# Patient Record
Sex: MALE | Race: WHITE
[De-identification: names, ages, dates, MRNs, and addresses within clinical notes are randomized per-mention and may not be internally consistent; named-entity substitution may affect disease eponyms.]

---

## 2019-10-10 ENCOUNTER — HOSPITAL ENCOUNTER (EMERGENCY)
Dept: HOSPITAL 26 - MED | Age: 59
Discharge: LEFT BEFORE DISCHARGE | End: 2019-10-10
Payer: SELF-PAY

## 2019-10-10 VITALS
SYSTOLIC BLOOD PRESSURE: 177 MMHG | WEIGHT: 195 LBS | BODY MASS INDEX: 28.88 KG/M2 | DIASTOLIC BLOOD PRESSURE: 100 MMHG | HEIGHT: 69 IN

## 2019-10-10 VITALS — SYSTOLIC BLOOD PRESSURE: 174 MMHG | DIASTOLIC BLOOD PRESSURE: 106 MMHG

## 2019-10-10 DIAGNOSIS — R07.9: ICD-10-CM

## 2019-10-10 DIAGNOSIS — F10.129: Primary | ICD-10-CM

## 2019-10-10 DIAGNOSIS — I10: ICD-10-CM

## 2019-10-10 LAB
ALBUMIN FLD-MCNC: 3.9 G/DL (ref 3.4–5)
ANION GAP SERPL CALCULATED.3IONS-SCNC: 16 MMOL/L (ref 8–16)
AST SERPL-CCNC: 149 U/L (ref 15–37)
BASOPHILS # BLD AUTO: 0.1 K/UL (ref 0–0.22)
BASOPHILS NFR BLD AUTO: 1.5 % (ref 0–2)
BILIRUB SERPL-MCNC: 1 MG/DL (ref 0–1)
BUN SERPL-MCNC: 4 MG/DL (ref 7–18)
CHLORIDE SERPL-SCNC: 96 MMOL/L (ref 98–107)
CO2 SERPL-SCNC: 26.9 MMOL/L (ref 21–32)
CREAT SERPL-MCNC: 0.7 MG/DL (ref 0.7–1.3)
EOSINOPHIL # BLD AUTO: 0.1 K/UL (ref 0–0.4)
EOSINOPHIL NFR BLD AUTO: 1.6 % (ref 0–4)
ERYTHROCYTE [DISTWIDTH] IN BLOOD BY AUTOMATED COUNT: 15 % (ref 11.6–13.7)
GFR SERPL CREATININE-BSD FRML MDRD: 149 ML/MIN (ref 90–?)
GLUCOSE SERPL-MCNC: 114 MG/DL (ref 74–106)
HCT VFR BLD AUTO: 46.3 % (ref 36–52)
HGB BLD-MCNC: 15.7 G/DL (ref 12–18)
LYMPHOCYTES # BLD AUTO: 1.4 K/UL (ref 2–11.5)
LYMPHOCYTES NFR BLD AUTO: 21 % (ref 20.5–51.1)
MCH RBC QN AUTO: 32 PG (ref 27–31)
MCHC RBC AUTO-ENTMCNC: 34 G/DL (ref 33–37)
MCV RBC AUTO: 93.4 FL (ref 80–94)
MONOCYTES # BLD AUTO: 0.7 K/UL (ref 0.8–1)
MONOCYTES NFR BLD AUTO: 11.4 % (ref 1.7–9.3)
NEUTROPHILS # BLD AUTO: 4.2 K/UL (ref 1.8–7.7)
NEUTROPHILS NFR BLD AUTO: 64.5 % (ref 42.2–75.2)
PLATELET # BLD AUTO: 250 K/UL (ref 140–450)
POTASSIUM SERPL-SCNC: 3.9 MMOL/L (ref 3.5–5.1)
RBC # BLD AUTO: 4.96 MIL/UL (ref 4.2–6.1)
SODIUM SERPL-SCNC: 135 MMOL/L (ref 136–145)
WBC # BLD AUTO: 6.5 K/UL (ref 4.8–10.8)

## 2019-10-10 PROCEDURE — 71045 X-RAY EXAM CHEST 1 VIEW: CPT

## 2019-10-10 PROCEDURE — 80053 COMPREHEN METABOLIC PANEL: CPT

## 2019-10-10 PROCEDURE — 93005 ELECTROCARDIOGRAM TRACING: CPT

## 2019-10-10 PROCEDURE — 99284 EMERGENCY DEPT VISIT MOD MDM: CPT

## 2019-10-10 PROCEDURE — 84484 ASSAY OF TROPONIN QUANT: CPT

## 2019-10-10 PROCEDURE — 85025 COMPLETE CBC W/AUTO DIFF WBC: CPT

## 2019-10-10 PROCEDURE — 36415 COLL VENOUS BLD VENIPUNCTURE: CPT

## 2019-10-10 NOTE — NUR
RECEIVED REPORT FROM LETICIA STOUT. PT STABLE AT THIS TIME. STATES HE WANTS TO GO 
HOME. MD MADE AWARE, WAITING FOR LAB RESULTS.

## 2019-10-10 NOTE — NUR
PATIENT ELOPED FROM FACILITY. DISCHARGE INSTRUCTIONS NOT GIVEN TO PATIENT. DR. CERON NOTIFIED. PT WAS INFORMED THAT  WAS WAITING ON LAB RESULTS BEFORE 
D/C.

## 2019-10-10 NOTE — NUR
BIBA FROM IN FRONT OF [facility]. PER EMS, PT ADMITS TO DRINKING BEER, 
JOSH WILD WAS ON SCENE. PT WITH INITIAL COMPLAINT OF MIDSTERNAL CP THAT HAS 
RESOLVED ON THE WAY HERE. PT DENIES ANY PAIN OR SOB AT THIS TIME. PATIENT 
STATES PAIN OF 0/10 AT THIS TIME; VSS; PATIENT POSITIONED FOR COMFORT; HOB 
ELEVATED; BEDRAILS UP X2; BED DOWN. ER MD MADE AWARE OF PT STATUS. PT IS ON 
MONITOR.

## 2020-06-24 ENCOUNTER — HOSPITAL ENCOUNTER (EMERGENCY)
Dept: HOSPITAL 26 - MED | Age: 60
Discharge: HOME | End: 2020-06-24
Payer: SELF-PAY

## 2020-06-24 VITALS — SYSTOLIC BLOOD PRESSURE: 124 MMHG | DIASTOLIC BLOOD PRESSURE: 72 MMHG

## 2020-06-24 VITALS — WEIGHT: 171 LBS | BODY MASS INDEX: 25.33 KG/M2 | HEIGHT: 69 IN

## 2020-06-24 VITALS — SYSTOLIC BLOOD PRESSURE: 134 MMHG | DIASTOLIC BLOOD PRESSURE: 84 MMHG

## 2020-06-24 DIAGNOSIS — R07.9: Primary | ICD-10-CM

## 2020-06-24 DIAGNOSIS — I10: ICD-10-CM

## 2020-06-24 LAB
ALBUMIN FLD-MCNC: 2.5 G/DL (ref 3.4–5)
ANION GAP SERPL CALCULATED.3IONS-SCNC: 13.6 MMOL/L (ref 8–16)
AST SERPL-CCNC: 84 U/L (ref 15–37)
BASOPHILS # BLD AUTO: 0.2 K/UL (ref 0–0.22)
BASOPHILS NFR BLD AUTO: 2.1 % (ref 0–2)
BILIRUB SERPL-MCNC: 0.7 MG/DL (ref 0–1)
BUN SERPL-MCNC: 3 MG/DL (ref 7–18)
CHLORIDE SERPL-SCNC: 95 MMOL/L (ref 98–107)
CO2 SERPL-SCNC: 26.8 MMOL/L (ref 21–32)
CREAT SERPL-MCNC: 0.9 MG/DL (ref 0.6–1.3)
EOSINOPHIL # BLD AUTO: 0.1 K/UL (ref 0–0.4)
EOSINOPHIL NFR BLD AUTO: 0.9 % (ref 0–4)
ERYTHROCYTE [DISTWIDTH] IN BLOOD BY AUTOMATED COUNT: 15.2 % (ref 11.6–13.7)
GFR SERPL CREATININE-BSD FRML MDRD: 111 ML/MIN (ref 90–?)
GLUCOSE SERPL-MCNC: 96 MG/DL (ref 74–106)
HCT VFR BLD AUTO: 41 % (ref 36–52)
HGB BLD-MCNC: 13.7 G/DL (ref 12–18)
LYMPHOCYTES # BLD AUTO: 2.7 K/UL (ref 2–11.5)
LYMPHOCYTES NFR BLD AUTO: 34.7 % (ref 20.5–51.1)
MCH RBC QN AUTO: 31 PG (ref 27–31)
MCHC RBC AUTO-ENTMCNC: 33 G/DL (ref 33–37)
MCV RBC AUTO: 94 FL (ref 80–94)
MONOCYTES # BLD AUTO: 0.7 K/UL (ref 0.8–1)
MONOCYTES NFR BLD AUTO: 9.3 % (ref 1.7–9.3)
NEUTROPHILS # BLD AUTO: 4.1 K/UL (ref 1.8–7.7)
NEUTROPHILS NFR BLD AUTO: 53 % (ref 42.2–75.2)
PLATELET # BLD AUTO: 267 K/UL (ref 140–450)
POTASSIUM SERPL-SCNC: 3.4 MMOL/L (ref 3.5–5.1)
RBC # BLD AUTO: 4.36 MIL/UL (ref 4.2–6.1)
SODIUM SERPL-SCNC: 132 MMOL/L (ref 136–145)
WBC # BLD AUTO: 7.8 K/UL (ref 4.8–10.8)

## 2020-06-24 NOTE — NUR
59/M C/O CHEST PAIN X 3 DAYS. STARTED AFTER DOING PUSH UPS, PAIN SUBSIDES WITH 
REST. TODAY CP STARTED AFTER DRINKING 6-7 BEERS TODAY. DENIES ASSOCIATED SOB, 
N/V, DIZZINESS. DENIES CP AT THIS TIME.



HX- DENIES

NKA

## 2021-03-03 ENCOUNTER — HOSPITAL ENCOUNTER (EMERGENCY)
Dept: HOSPITAL 26 - MED | Age: 61
Discharge: NURSING, REHAB OR LONG-TERM CARE | End: 2021-03-03
Payer: SELF-PAY

## 2021-03-03 VITALS — DIASTOLIC BLOOD PRESSURE: 64 MMHG | SYSTOLIC BLOOD PRESSURE: 125 MMHG

## 2021-03-03 VITALS — BODY MASS INDEX: 25.44 KG/M2 | HEIGHT: 64 IN | WEIGHT: 149 LBS

## 2021-03-03 VITALS — SYSTOLIC BLOOD PRESSURE: 142 MMHG | DIASTOLIC BLOOD PRESSURE: 99 MMHG
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
## 2021-03-03 DIAGNOSIS — S01.01XA: Primary | ICD-10-CM
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
## 2021-03-03 DIAGNOSIS — Z87.448: ICD-10-CM

## 2021-03-03 DIAGNOSIS — S09.8XXA: ICD-10-CM

## 2021-03-03 DIAGNOSIS — I10: ICD-10-CM

## 2021-03-03 DIAGNOSIS — Z79.899: ICD-10-CM

## 2021-03-03 PROCEDURE — 13121 CMPLX RPR S/A/L 2.6-7.5 CM: CPT

## 2021-03-03 PROCEDURE — 96372 THER/PROPH/DIAG INJ SC/IM: CPT

## 2021-03-03 PROCEDURE — 70450 CT HEAD/BRAIN W/O DYE: CPT

## 2021-03-03 PROCEDURE — 72125 CT NECK SPINE W/O DYE: CPT

## 2021-03-03 PROCEDURE — 99285 EMERGENCY DEPT VISIT HI MDM: CPT

## 2021-03-03 NOTE — NUR
s/w Zully, from [facility]. [phone]. 

stated only to be seen for hematoma on head and that Hospice will arrange for 
transport back after d/c.

## 2021-03-03 NOTE — NUR
s/w Helena from [facility] for an update of pt. report given. ETA for 
transport approximately [phone].

## 2021-03-03 NOTE — NUR
pt remains laying down in bed in semi fowlers position. pt a/o x 4, gcs 15. 
able to move all extremities. Does not appear to be in any distress. awaiting 
transport back to facility.

## 2021-07-27 ENCOUNTER — HOSPITAL ENCOUNTER (INPATIENT)
Dept: HOSPITAL 26 - MED | Age: 61
LOS: 2 days | Discharge: HOME HEALTH | DRG: 227 | End: 2021-07-29
Attending: INTERNAL MEDICINE | Admitting: INTERNAL MEDICINE
Payer: COMMERCIAL

## 2021-07-27 VITALS — HEIGHT: 69 IN | WEIGHT: 180 LBS | BODY MASS INDEX: 26.66 KG/M2

## 2021-07-27 VITALS — SYSTOLIC BLOOD PRESSURE: 149 MMHG | DIASTOLIC BLOOD PRESSURE: 106 MMHG

## 2021-07-27 DIAGNOSIS — K76.6: ICD-10-CM

## 2021-07-27 DIAGNOSIS — K74.60: ICD-10-CM

## 2021-07-27 DIAGNOSIS — Z20.822: ICD-10-CM

## 2021-07-27 DIAGNOSIS — I10: ICD-10-CM

## 2021-07-27 DIAGNOSIS — R18.8: ICD-10-CM

## 2021-07-27 DIAGNOSIS — Z79.899: ICD-10-CM

## 2021-07-27 DIAGNOSIS — E44.0: ICD-10-CM

## 2021-07-27 DIAGNOSIS — E86.0: ICD-10-CM

## 2021-07-27 DIAGNOSIS — K42.0: Primary | ICD-10-CM

## 2021-07-27 LAB
ALBUMIN FLD-MCNC: 2.8 G/DL (ref 3.4–5)
ANION GAP SERPL CALCULATED.3IONS-SCNC: 7.2 MMOL/L (ref 8–16)
APPEARANCE UR: (no result)
AST SERPL-CCNC: 35 U/L (ref 15–37)
BASOPHILS # BLD AUTO: 0.1 K/UL (ref 0–0.22)
BASOPHILS NFR BLD AUTO: 0.3 % (ref 0–2)
BILIRUB SERPL-MCNC: 1.2 MG/DL (ref 0–1)
BILIRUB UR QL STRIP: (no result)
BUN SERPL-MCNC: 31 MG/DL (ref 7–18)
CHLORIDE SERPL-SCNC: 93 MMOL/L (ref 98–107)
CO2 SERPL-SCNC: 35.9 MMOL/L (ref 21–32)
COLOR UR: (no result)
CREAT SERPL-MCNC: 1.3 MG/DL (ref 0.6–1.3)
EOSINOPHIL # BLD AUTO: 0 K/UL (ref 0–0.4)
EOSINOPHIL NFR BLD AUTO: 0.1 % (ref 0–4)
ERYTHROCYTE [DISTWIDTH] IN BLOOD BY AUTOMATED COUNT: 13.9 % (ref 11.6–13.7)
GFR SERPL CREATININE-BSD FRML MDRD: 72 ML/MIN (ref 90–?)
GLUCOSE SERPL-MCNC: 110 MG/DL (ref 74–106)
GLUCOSE UR STRIP-MCNC: NEGATIVE MG/DL
HCT VFR BLD AUTO: 47.5 % (ref 36–52)
HGB BLD-MCNC: 16 G/DL (ref 12–18)
HGB UR QL STRIP: (no result)
LEUKOCYTE ESTERASE UR QL STRIP: NEGATIVE
LIPASE SERPL-CCNC: 62 U/L (ref 73–393)
LYMPHOCYTES # BLD AUTO: 1.2 K/UL (ref 2–11.5)
LYMPHOCYTES NFR BLD AUTO: 7 % (ref 20.5–51.1)
MCH RBC QN AUTO: 31 PG (ref 27–31)
MCHC RBC AUTO-ENTMCNC: 34 G/DL (ref 33–37)
MCV RBC AUTO: 91.1 FL (ref 80–94)
MONOCYTES # BLD AUTO: 1.4 K/UL (ref 0.8–1)
MONOCYTES NFR BLD AUTO: 8 % (ref 1.7–9.3)
NEUTROPHILS # BLD AUTO: 14.4 K/UL (ref 1.8–7.7)
NEUTROPHILS NFR BLD AUTO: 84.6 % (ref 42.2–75.2)
NITRITE UR QL STRIP: NEGATIVE
PH UR STRIP: 5.5 [PH] (ref 5–9)
PLATELET # BLD AUTO: 307 K/UL (ref 140–450)
POTASSIUM SERPL-SCNC: 4.1 MMOL/L (ref 3.5–5.1)
PROTHROMBIN TIME: 10.3 SECS (ref 10.8–13.4)
RBC # BLD AUTO: 5.22 MIL/UL (ref 4.2–6.1)
SODIUM SERPL-SCNC: 132 MMOL/L (ref 136–145)
WBC # BLD AUTO: 17.1 K/UL (ref 4.8–10.8)

## 2021-07-27 PROCEDURE — P9041 ALBUMIN (HUMAN),5%, 50ML: HCPCS

## 2021-07-27 PROCEDURE — P9046 ALBUMIN (HUMAN), 25%, 20 ML: HCPCS

## 2021-07-27 PROCEDURE — 0WQF0ZZ REPAIR ABDOMINAL WALL, OPEN APPROACH: ICD-10-PCS | Performed by: SURGERY

## 2021-07-27 RX ADMIN — DEXTROSE SCH MLS/HR: 50 INJECTION, SOLUTION INTRAVENOUS at 21:00

## 2021-07-27 RX ADMIN — SODIUM CHLORIDE SCH MLS/HR: 9 INJECTION, SOLUTION INTRAVENOUS at 18:13

## 2021-07-28 VITALS — DIASTOLIC BLOOD PRESSURE: 97 MMHG | SYSTOLIC BLOOD PRESSURE: 140 MMHG

## 2021-07-28 VITALS — SYSTOLIC BLOOD PRESSURE: 115 MMHG | DIASTOLIC BLOOD PRESSURE: 69 MMHG

## 2021-07-28 VITALS — DIASTOLIC BLOOD PRESSURE: 71 MMHG | SYSTOLIC BLOOD PRESSURE: 107 MMHG

## 2021-07-28 VITALS — DIASTOLIC BLOOD PRESSURE: 77 MMHG | SYSTOLIC BLOOD PRESSURE: 108 MMHG

## 2021-07-28 LAB
ALBUMIN FLD-MCNC: 2.4 G/DL (ref 3.4–5)
ANION GAP SERPL CALCULATED.3IONS-SCNC: 9.6 MMOL/L (ref 8–16)
AST SERPL-CCNC: 25 U/L (ref 15–37)
BASOPHILS # BLD AUTO: 0.1 K/UL (ref 0–0.22)
BASOPHILS NFR BLD AUTO: 0.6 % (ref 0–2)
BILIRUB SERPL-MCNC: 1.1 MG/DL (ref 0–1)
BUN SERPL-MCNC: 29 MG/DL (ref 7–18)
CHLORIDE SERPL-SCNC: 101 MMOL/L (ref 98–107)
CO2 SERPL-SCNC: 26.5 MMOL/L (ref 21–32)
CREAT SERPL-MCNC: 1.1 MG/DL (ref 0.6–1.3)
EOSINOPHIL # BLD AUTO: 0.2 K/UL (ref 0–0.4)
EOSINOPHIL NFR BLD AUTO: 1.9 % (ref 0–4)
ERYTHROCYTE [DISTWIDTH] IN BLOOD BY AUTOMATED COUNT: 13.7 % (ref 11.6–13.7)
GFR SERPL CREATININE-BSD FRML MDRD: 88 ML/MIN (ref 90–?)
GLUCOSE SERPL-MCNC: 72 MG/DL (ref 74–106)
HCT VFR BLD AUTO: 40.1 % (ref 36–52)
HGB BLD-MCNC: 13.5 G/DL (ref 12–18)
LYMPHOCYTES # BLD AUTO: 2.1 K/UL (ref 2–11.5)
LYMPHOCYTES NFR BLD AUTO: 18.8 % (ref 20.5–51.1)
MAGNESIUM SERPL-MCNC: 2.1 MG/DL (ref 1.8–2.4)
MCH RBC QN AUTO: 31 PG (ref 27–31)
MCHC RBC AUTO-ENTMCNC: 34 G/DL (ref 33–37)
MCV RBC AUTO: 92.4 FL (ref 80–94)
MONOCYTES # BLD AUTO: 1.1 K/UL (ref 0.8–1)
MONOCYTES NFR BLD AUTO: 9.7 % (ref 1.7–9.3)
NEUTROPHILS # BLD AUTO: 7.8 K/UL (ref 1.8–7.7)
NEUTROPHILS NFR BLD AUTO: 69 % (ref 42.2–75.2)
PLATELET # BLD AUTO: 223 K/UL (ref 140–450)
POTASSIUM SERPL-SCNC: 3.1 MMOL/L (ref 3.5–5.1)
RBC # BLD AUTO: 4.34 MIL/UL (ref 4.2–6.1)
SODIUM SERPL-SCNC: 134 MMOL/L (ref 136–145)
WBC # BLD AUTO: 11.3 K/UL (ref 4.8–10.8)

## 2021-07-28 RX ADMIN — DEXTROSE SCH MLS/HR: 50 INJECTION, SOLUTION INTRAVENOUS at 05:55

## 2021-07-28 RX ADMIN — DEXTROSE SCH MLS/HR: 50 INJECTION, SOLUTION INTRAVENOUS at 20:45

## 2021-07-28 RX ADMIN — SODIUM CHLORIDE SCH MLS/HR: 9 INJECTION, SOLUTION INTRAVENOUS at 13:55

## 2021-07-28 RX ADMIN — SODIUM CHLORIDE SCH MLS/HR: 9 INJECTION, SOLUTION INTRAVENOUS at 00:52

## 2021-07-28 RX ADMIN — ENOXAPARIN SODIUM SCH MG: 40 INJECTION SUBCUTANEOUS at 08:08

## 2021-07-28 RX ADMIN — DEXTROSE SCH MLS/HR: 50 INJECTION, SOLUTION INTRAVENOUS at 13:55

## 2021-07-28 RX ADMIN — SODIUM CHLORIDE SCH MLS/HR: 9 INJECTION, SOLUTION INTRAVENOUS at 05:55

## 2021-07-29 VITALS — SYSTOLIC BLOOD PRESSURE: 132 MMHG | DIASTOLIC BLOOD PRESSURE: 74 MMHG

## 2021-07-29 VITALS — SYSTOLIC BLOOD PRESSURE: 111 MMHG | DIASTOLIC BLOOD PRESSURE: 75 MMHG

## 2021-07-29 VITALS — SYSTOLIC BLOOD PRESSURE: 109 MMHG | DIASTOLIC BLOOD PRESSURE: 69 MMHG

## 2021-07-29 VITALS — DIASTOLIC BLOOD PRESSURE: 73 MMHG | SYSTOLIC BLOOD PRESSURE: 115 MMHG

## 2021-07-29 LAB
ALBUMIN FLD-MCNC: 2.1 G/DL (ref 3.4–5)
ANION GAP SERPL CALCULATED.3IONS-SCNC: 9.2 MMOL/L (ref 8–16)
AST SERPL-CCNC: 20 U/L (ref 15–37)
BASOPHILS # BLD AUTO: 0.1 K/UL (ref 0–0.22)
BASOPHILS NFR BLD AUTO: 0.5 % (ref 0–2)
BILIRUB SERPL-MCNC: 1.1 MG/DL (ref 0–1)
BUN SERPL-MCNC: 19 MG/DL (ref 7–18)
CHLORIDE SERPL-SCNC: 101 MMOL/L (ref 98–107)
CO2 SERPL-SCNC: 25.8 MMOL/L (ref 21–32)
CREAT SERPL-MCNC: 1.1 MG/DL (ref 0.6–1.3)
EOSINOPHIL # BLD AUTO: 0.2 K/UL (ref 0–0.4)
EOSINOPHIL NFR BLD AUTO: 2.1 % (ref 0–4)
ERYTHROCYTE [DISTWIDTH] IN BLOOD BY AUTOMATED COUNT: 13.5 % (ref 11.6–13.7)
GFR SERPL CREATININE-BSD FRML MDRD: 88 ML/MIN (ref 90–?)
GLUCOSE SERPL-MCNC: 97 MG/DL (ref 74–106)
HCT VFR BLD AUTO: 41.8 % (ref 36–52)
HGB BLD-MCNC: 14.1 G/DL (ref 12–18)
LYMPHOCYTES # BLD AUTO: 1.3 K/UL (ref 2–11.5)
LYMPHOCYTES NFR BLD AUTO: 10.8 % (ref 20.5–51.1)
MCH RBC QN AUTO: 31 PG (ref 27–31)
MCHC RBC AUTO-ENTMCNC: 34 G/DL (ref 33–37)
MCV RBC AUTO: 91.7 FL (ref 80–94)
MONOCYTES # BLD AUTO: 1.2 K/UL (ref 0.8–1)
MONOCYTES NFR BLD AUTO: 10.4 % (ref 1.7–9.3)
NEUTROPHILS # BLD AUTO: 9.1 K/UL (ref 1.8–7.7)
NEUTROPHILS NFR BLD AUTO: 76.2 % (ref 42.2–75.2)
PLATELET # BLD AUTO: 216 K/UL (ref 140–450)
POTASSIUM SERPL-SCNC: 3 MMOL/L (ref 3.5–5.1)
RBC # BLD AUTO: 4.56 MIL/UL (ref 4.2–6.1)
SODIUM SERPL-SCNC: 133 MMOL/L (ref 136–145)
WBC # BLD AUTO: 11.9 K/UL (ref 4.8–10.8)

## 2021-07-29 RX ADMIN — SODIUM CHLORIDE SCH MLS/HR: 9 INJECTION, SOLUTION INTRAVENOUS at 06:05

## 2021-07-29 RX ADMIN — DEXTROSE SCH MLS/HR: 50 INJECTION, SOLUTION INTRAVENOUS at 04:20

## 2021-07-29 RX ADMIN — ENOXAPARIN SODIUM SCH MG: 40 INJECTION SUBCUTANEOUS at 08:54

## 2021-07-29 RX ADMIN — DEXTROSE SCH MLS/HR: 50 INJECTION, SOLUTION INTRAVENOUS at 13:25
